# Patient Record
Sex: MALE | Race: OTHER | NOT HISPANIC OR LATINO
[De-identification: names, ages, dates, MRNs, and addresses within clinical notes are randomized per-mention and may not be internally consistent; named-entity substitution may affect disease eponyms.]

---

## 2020-01-27 ENCOUNTER — RECORD ABSTRACTING (OUTPATIENT)
Age: 80
End: 2020-01-27

## 2020-01-27 DIAGNOSIS — Z87.898 PERSONAL HISTORY OF OTHER SPECIFIED CONDITIONS: ICD-10-CM

## 2020-01-27 DIAGNOSIS — Z87.440 PERSONAL HISTORY OF URINARY (TRACT) INFECTIONS: ICD-10-CM

## 2020-01-27 DIAGNOSIS — Z87.438 PERSONAL HISTORY OF OTHER DISEASES OF MALE GENITAL ORGANS: ICD-10-CM

## 2020-01-27 LAB
PSA SERPL-MCNC: 0.2
TESTOST BND SERPL-MCNC: 106.8

## 2020-01-27 RX ORDER — SILDENAFIL CITRATE 100 MG/1
100 TABLET, FILM COATED ORAL
Refills: 0 | Status: ACTIVE | COMMUNITY

## 2020-01-27 RX ORDER — TADALAFIL 20 MG/1
20 TABLET, FILM COATED ORAL
Refills: 0 | Status: ACTIVE | COMMUNITY

## 2020-02-05 RX ORDER — FINASTERIDE 5 MG/1
5 TABLET, FILM COATED ORAL DAILY
Qty: 90 | Refills: 0 | Status: ACTIVE | COMMUNITY
Start: 1900-01-01 | End: 1900-01-01

## 2020-03-10 ENCOUNTER — APPOINTMENT (OUTPATIENT)
Dept: UROLOGY | Facility: CLINIC | Age: 80
End: 2020-03-10
Payer: MEDICARE

## 2020-03-10 VITALS
WEIGHT: 263 LBS | HEIGHT: 70 IN | SYSTOLIC BLOOD PRESSURE: 188 MMHG | BODY MASS INDEX: 37.65 KG/M2 | DIASTOLIC BLOOD PRESSURE: 78 MMHG | TEMPERATURE: 98.3 F

## 2020-03-10 DIAGNOSIS — Z00.00 ENCOUNTER FOR GENERAL ADULT MEDICAL EXAMINATION W/OUT ABNORMAL FINDINGS: ICD-10-CM

## 2020-03-10 LAB
BILIRUB UR QL STRIP: NORMAL
CLARITY UR: CLEAR
COLLECTION METHOD: NORMAL
GLUCOSE UR-MCNC: 1000
HCG UR QL: 0.2 EU/DL
HGB UR QL STRIP.AUTO: NORMAL
KETONES UR-MCNC: NORMAL
LEUKOCYTE ESTERASE UR QL STRIP: NORMAL
NITRITE UR QL STRIP: NORMAL
PH UR STRIP: 5.5
PROT UR STRIP-MCNC: NORMAL
SP GR UR STRIP: 1.01

## 2020-03-10 PROCEDURE — 51741 ELECTRO-UROFLOWMETRY FIRST: CPT

## 2020-03-10 PROCEDURE — 99214 OFFICE O/P EST MOD 30 MIN: CPT | Mod: 25

## 2020-03-10 PROCEDURE — 81003 URINALYSIS AUTO W/O SCOPE: CPT | Mod: QW

## 2020-03-10 PROCEDURE — 76857 US EXAM PELVIC LIMITED: CPT

## 2020-03-10 NOTE — PHYSICAL EXAM
[General Appearance - Well Developed] : well developed [General Appearance - Well Nourished] : well nourished [Normal Appearance] : normal appearance [Well Groomed] : well groomed [General Appearance - In No Acute Distress] : no acute distress [Abdomen Soft] : soft [Abdomen Tenderness] : non-tender [Costovertebral Angle Tenderness] : no ~M costovertebral angle tenderness [Edema] : no peripheral edema [] : no respiratory distress [Respiration, Rhythm And Depth] : normal respiratory rhythm and effort [Exaggerated Use Of Accessory Muscles For Inspiration] : no accessory muscle use [Oriented To Time, Place, And Person] : oriented to person, place, and time [Affect] : the affect was normal [Mood] : the mood was normal [Not Anxious] : not anxious [FreeTextEntry1] : Some memory loss as to medication he is taking [Normal Station and Gait] : the gait and station were normal for the patient's age

## 2020-03-10 NOTE — HISTORY OF PRESENT ILLNESS
[FreeTextEntry1] : 79 year old male last seen by me 8/31/2018 for BPH, incontinence on Finasteride and Alfuzosin. He had a small prostate on cystoscopy and urodynamics testing indicated intrinsic bladder dysfunction. myrbetriq was added to his medicines at that time. Last PSA from 7/24/18 0.2 (on Finasteride).\par \par Patient also has DM, hypothyroidism, elevated cholesterol, angina, HTN, cardiac stents and valve replacement.\par \par Wife tells me that patient has not had the Uroxatral or the finasteride since the beginning of this year. Eng also has not taken the Myrbetriq due to potential cardiac side effects.She also tells me that his memory is failing and is under care for this.\par \par UA today dipstick negative,PVR = 60 cc, prostate - 43 gm, uroflowmetry fair with Peak flow = 8.5 cc/sec, Mean flow = 4 cc/sec, Voided volume = 138 ml.

## 2020-03-10 NOTE — ASSESSMENT
[FreeTextEntry1] : I had an extensive discussion with the patient and his wife about continued use of the alfuzosin and finasteride in view of his stable PVR and prostate size but only fair uroflowmetry. We also discussed the need for routine FU at least yearly. Meds were renewed. Genesis Jiménez MD\par

## 2020-03-10 NOTE — LETTER BODY
[Dear  ___] : Dear  [unfilled], [Courtesy Letter:] : I had the pleasure of seeing your patient, [unfilled], in my office today. [Please see my note below.] : Please see my note below. [Consult Closing:] : Thank you very much for allowing me to participate in the care of this patient.  If you have any questions, please do not hesitate to contact me. [FreeTextEntry3] : Best Regards, \par \par Genesis Jiménez MD\par

## 2020-03-12 LAB — BACTERIA UR CULT: NORMAL

## 2020-08-19 ENCOUNTER — APPOINTMENT (OUTPATIENT)
Dept: UROLOGY | Facility: CLINIC | Age: 80
End: 2020-08-19
Payer: MEDICARE

## 2020-08-19 PROCEDURE — 99442: CPT | Mod: 95

## 2020-08-19 NOTE — LETTER BODY
[Please see my note below.] : Please see my note below. [Dear  ___] : Dear ~ALMA, [Consult Closing:] : Thank you very much for allowing me to participate in the care of this patient.  If you have any questions, please do not hesitate to contact me. [FreeTextEntry1] : I had the pleasure of evaluation of your patient today via a telehealth virtual visit.\par  [FreeTextEntry2] : Seamus Frost MD [FreeTextEntry3] : Best personal regards,\par \par Genesis\par

## 2020-08-19 NOTE — HISTORY OF PRESENT ILLNESS
[Other Location: e.g. Home (Enter Location, City,State)___] : at [unfilled] [Home] : at home, [unfilled] , at the time of the visit. [Verbal consent obtained from patient] : the patient, [unfilled] [Spouse] : spouse [FreeTextEntry1] :  The patient-doctor relationship has been established via telephone communication. The patient's identity has been confirmed. The patient was previously emailed a copy of the telemedicine consent. They have had a chance to review and has now given verbal consent and has requested care to be assessed and treated through telemedicine and understands there maybe limitations in this process and they may need further follow up care in the office and or hospital settings. \par Verbal consent given on 8/19/2020, at 1:10 PM, by Shahram Leon.\par \par This 79 year old male was last seen on 3/10 59917 for BPH and incontinence which is being treated with finasteride and alfuzosin. This visit today is for pain in the right groin, hip and scrotum which the patient states has bee present for the last 4 days or so. His wife tells me that it has been present for longer, and the patient admits that he is having some issues with memory. He has not had any fever, and denies any N/V, constipation or diarrhea. He continue to urinate well on the above mediations. He has not tried any medication for the pain, other than Tylenol, and he denies any lumps or swelling.\par \par  The patient denies fevers, chills, nausea and or vomiting and no unexplained weight loss. \par All pertinent parts of the patient PFSH (past medical, family and social histories), laboratory, radiological studies and physician notes were reviewed prior to starting the face to face portion of the telemedicine visit. Questionnaire results were discussed with patient.\par

## 2020-08-19 NOTE — ASSESSMENT
[FreeTextEntry1] : We discussed possible diagnoses and I recommended that he have a scrotal US and urine C+S now. I also recommended that he take Advil for the pain while we wait for the results of these tests. I also asked him to call the office or proceed to the ER if his pain became significantly worse, he developed fever, he developed N/V. We ended the audio portion of the visit at 1:21 PM. Genesis Jiménez MD\par

## 2021-05-20 ENCOUNTER — APPOINTMENT (OUTPATIENT)
Dept: UROLOGY | Facility: CLINIC | Age: 81
End: 2021-05-20
Payer: MEDICARE

## 2021-05-20 ENCOUNTER — NON-APPOINTMENT (OUTPATIENT)
Age: 81
End: 2021-05-20

## 2021-05-20 VITALS — HEART RATE: 84 BPM | SYSTOLIC BLOOD PRESSURE: 156 MMHG | DIASTOLIC BLOOD PRESSURE: 89 MMHG | TEMPERATURE: 97.2 F

## 2021-05-20 LAB
BILIRUB UR QL STRIP: NORMAL
CLARITY UR: CLEAR
COLLECTION METHOD: NORMAL
GLUCOSE UR-MCNC: 1000
HCG UR QL: 1 EU/DL
HGB UR QL STRIP.AUTO: NORMAL
KETONES UR-MCNC: NORMAL
LEUKOCYTE ESTERASE UR QL STRIP: NORMAL
NITRITE UR QL STRIP: NORMAL
PH UR STRIP: 5
PROT UR STRIP-MCNC: NORMAL
SP GR UR STRIP: 1.01

## 2021-05-20 PROCEDURE — 51798 US URINE CAPACITY MEASURE: CPT

## 2021-05-20 PROCEDURE — 99214 OFFICE O/P EST MOD 30 MIN: CPT

## 2021-05-20 NOTE — HISTORY OF PRESENT ILLNESS
[FreeTextEntry1] : 79 yo male returns for follow up.  He has been seen previously for a hx of BPH.  He has been on finasteride and alfuzosin for many years.  His urinary symptoms have generally been stable over time, but he does note some increase in urinary urgency with occasional urge incontinence.  This happens less than once a month.  \par \par PVR = 49 cc\par Prostate volume = 81 cc

## 2021-05-20 NOTE — PHYSICAL EXAM
[General Appearance - Well Developed] : well developed [Normal Appearance] : normal appearance [General Appearance - In No Acute Distress] : no acute distress [FreeTextEntry1] : Obese [Abdomen Soft] : soft [Abdomen Tenderness] : non-tender [Abdomen Hernia] : no hernia was discovered [Urethral Meatus] : meatus normal [Epididymis] : the epididymides were normal [Penis Abnormality] : normal circumcised penis [Testes Tenderness] : no tenderness of the testes [Testes Mass (___cm)] : there were no testicular masses [Prostate Tenderness] : the prostate was not tender [No Prostate Nodules] : no prostate nodules [Prostate Size ___ (0-4)] : prostate size [unfilled] (scale: 0-4) [Skin Color & Pigmentation] : normal skin color and pigmentation [Heart Rate And Rhythm] : Heart rate and rhythm were normal [] : no respiratory distress [Oriented To Time, Place, And Person] : oriented to person, place, and time [Normal Station and Gait] : the gait and station were normal for the patient's age [No Focal Deficits] : no focal deficits

## 2021-05-20 NOTE — ASSESSMENT
[FreeTextEntry1] : 79 yo male with hx of BPH on alpha blocker and 5ARI therapy now with occasional urinary urgency and urge incontinence.  We discussed treatment options including adding a beta-3 agonist to his medication regimen.  He does not have urgency often enough for him to want to start taking another medication.  We also discussed surgical options to treat BPH.  I have given them patient education information that reviews more invasive and less invasive options.  He will continue with his current treatment regimen and he will return if he would like to proceed with other treatment options.

## 2021-05-21 LAB
APPEARANCE: CLEAR
BACTERIA: NEGATIVE
BILIRUBIN URINE: NEGATIVE
BLOOD URINE: NEGATIVE
COLOR: YELLOW
GLUCOSE QUALITATIVE U: ABNORMAL
HYALINE CASTS: 0 /LPF
KETONES URINE: NEGATIVE
LEUKOCYTE ESTERASE URINE: NEGATIVE
MICROSCOPIC-UA: NORMAL
NITRITE URINE: NEGATIVE
PH URINE: 6
PROTEIN URINE: NORMAL
RED BLOOD CELLS URINE: 1 /HPF
SPECIFIC GRAVITY URINE: 1.04
SQUAMOUS EPITHELIAL CELLS: 0 /HPF
UROBILINOGEN URINE: NORMAL
WHITE BLOOD CELLS URINE: 0 /HPF

## 2021-05-24 LAB — BACTERIA UR CULT: NORMAL

## 2021-12-23 RX ORDER — ALFUZOSIN HYDROCHLORIDE 10 MG/1
10 TABLET, EXTENDED RELEASE ORAL DAILY
Qty: 90 | Refills: 1 | Status: ACTIVE | COMMUNITY
Start: 2020-03-10 | End: 1900-01-01

## 2022-07-28 ENCOUNTER — APPOINTMENT (OUTPATIENT)
Dept: UROLOGY | Facility: CLINIC | Age: 82
End: 2022-07-28

## 2022-07-28 LAB
BILIRUB UR QL STRIP: NORMAL
CLARITY UR: CLEAR
COLLECTION METHOD: NORMAL
GLUCOSE UR-MCNC: NORMAL
HCG UR QL: 1 EU/DL
HGB UR QL STRIP.AUTO: NORMAL
KETONES UR-MCNC: NORMAL
LEUKOCYTE ESTERASE UR QL STRIP: NORMAL
NITRITE UR QL STRIP: NORMAL
PH UR STRIP: 5.5
PROT UR STRIP-MCNC: NORMAL
SP GR UR STRIP: 1.01

## 2022-07-28 PROCEDURE — 51798 US URINE CAPACITY MEASURE: CPT

## 2022-07-28 PROCEDURE — 99214 OFFICE O/P EST MOD 30 MIN: CPT

## 2022-07-28 PROCEDURE — 81003 URINALYSIS AUTO W/O SCOPE: CPT | Mod: QW

## 2022-07-28 NOTE — ASSESSMENT
[FreeTextEntry1] : Patient appears to have had a good response to the alfuzosin and finasteride with good bladder emptying and minimal nocturia.  He requested and I agree with renewals of these medications.  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  I reviewed with the patient and his wife the indications risks benefits and chances for success with this medication and the need to be on the medicine for 2 to 3 weeks before any improvement might be seen.  With their agreement to this plan we ordered Myrbetriq 25 mg daily for the patient.  If all goes well we will follow-up in 6 months otherwise they should call sooner for another appointment. Urine sent for C+S and cytology.  Genesis Jiménez MD\par

## 2022-07-28 NOTE — HISTORY OF PRESENT ILLNESS
[FreeTextEntry1] : 82 YO M seen on 8/19/2020 via [facility] for BPH and incontinence which is being treated with finasteride and alfuzosin. This visit today is for pain in the right groin, hip and scrotum which the patient states has bee present for the last 4 days or so. His wife tells me that it has been present for longer, and the patient admits that he is having some issues with memory. He has not had any fever, and denies any N/V, constipation or diarrhea. He continue to urinate well on the above mediations. He has not tried any medication for the pain, other than Tylenol, and he denies any lumps or swelling.\par We discussed possible diagnoses and I recommended that he have a scrotal US and urine C+S now. I also recommended that he take Advil for the pain while we wait for the results of these tests. I also asked him to call the office or proceed to the ER if his pain became significantly worse, he developed fever, he developed N/V.\par US 8/24/2020 No testicular masses.\par \par Patient saw [facility] 5/20/2021. They discussed surgical options for BPH v. addition of Beta 3 agonist. Patient to consider.\par \par Patient seen TODAY 7/28/2022 to reassess. He continues to have urgency and urge incontinence. He decided against the B 3 agonist last visit since he was on too many pills. He also reports nocturia x 0 - 2, no hematuria or dysuria.\par UA 1000 GLU (on Jardiance)\par PVR 30 cc\par \par Patient on multiple cardiac and DM medication.\par  The patient denies fevers, chills, nausea and or vomiting and no unexplained weight loss. \par All pertinent parts of the patient PFSH (past medical, family and social histories), laboratory, radiological studies and physician notes were reviewed prior to starting the face to face portion of the telemedicine visit. Questionnaire results were discussed with patient.\par

## 2022-07-28 NOTE — PHYSICAL EXAM
[Normal Appearance] : normal appearance [General Appearance - In No Acute Distress] : no acute distress [Abdomen Soft] : soft [Abdomen Tenderness] : non-tender [Costovertebral Angle Tenderness] : no ~M costovertebral angle tenderness [Urethral Meatus] : meatus normal [Penis Abnormality] : normal circumcised penis [Urinary Bladder Findings] : the bladder was normal on palpation [Scrotum] : the scrotum was normal [Epididymis] : the epididymides were normal [Testes Tenderness] : no tenderness of the testes [Testes Mass (___cm)] : there were no testicular masses [Prostate Tenderness] : the prostate was not tender [No Prostate Nodules] : no prostate nodules [Prostate Size ___ (0-4)] : prostate size [unfilled] (scale: 0-4) [Edema] : no peripheral edema [] : no respiratory distress [Respiration, Rhythm And Depth] : normal respiratory rhythm and effort [Exaggerated Use Of Accessory Muscles For Inspiration] : no accessory muscle use [Oriented To Time, Place, And Person] : oriented to person, place, and time [Affect] : the affect was normal [Mood] : the mood was normal [Not Anxious] : not anxious [FreeTextEntry1] : Ambulates with a cane, slowly [No Focal Deficits] : no focal deficits

## 2022-08-03 LAB
BACTERIA UR CULT: NORMAL
URINE CYTOLOGY: NORMAL

## 2023-01-26 ENCOUNTER — APPOINTMENT (OUTPATIENT)
Dept: UROLOGY | Facility: CLINIC | Age: 83
End: 2023-01-26
Payer: MEDICARE

## 2023-01-30 ENCOUNTER — RX RENEWAL (OUTPATIENT)
Age: 83
End: 2023-01-30

## 2023-02-01 NOTE — PHYSICAL EXAM
[Normal Appearance] : normal appearance [General Appearance - In No Acute Distress] : no acute distress [Oriented To Time, Place, And Person] : oriented to person, place, and time [Affect] : the affect was normal [Mood] : the mood was normal [Not Anxious] : not anxious [FreeTextEntry1] : Obese

## 2023-02-01 NOTE — HISTORY OF PRESENT ILLNESS
[FreeTextEntry1] : 81 YO M seen on 8/19/2020 via [facility] for BPH and incontinence which is being treated with finasteride and alfuzosin. This visit today is for pain in the right groin, hip and scrotum which the patient states has bee present for the last 4 days or so. His wife tells me that it has been present for longer, and the patient admits that he is having some issues with memory. He has not had any fever, and denies any N/V, constipation or diarrhea. He continue to urinate well on the above mediations. He has not tried any medication for the pain, other than Tylenol, and he denies any lumps or swelling.\par We discussed possible diagnoses and I recommended that he have a scrotal US and urine C+S now. I also recommended that he take Advil for the pain while we wait for the results of these tests. I also asked him to call the office or proceed to the ER if his pain became significantly worse, he developed fever, he developed N/V.\par US 8/24/2020 No testicular masses.\par \par Patient saw Ema 5/20/2021. They discussed surgical options for BPH v. addition of Beta 3 agonist. Patient to consider.\par \par Patient seen 7/28/2022 to reassess. He continues to have urgency and urge incontinence. He decided against the B 3 agonist last visit since he was on too many pills. He also reports nocturia x 0 - 2, no hematuria or dysuria.\par UA 1000 GLU (on Jardiance)\par PVR 30 cc\par \par Patient appears to have had a good response to the alfuzosin and finasteride with good bladder emptying and minimal nocturia.  He requested and I agree with renewals of these medications.  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  I reviewed with the patient and his wife the indications risks benefits and chances for success with this medication and the need to be on the medicine for 2 to 3 weeks before any improvement might be seen.  With their agreement to this plan we ordered Myrbetriq 25 mg daily for the patient.  If all goes well we will follow-up in 6 months otherwise they should call sooner for another appointment. Urine sent for C+S and cytology.  \par \par urine C+S and cytology negative\par \par Patient cancelled 1/26/2023 to reassess LUTS. \par \par \par Patient on multiple cardiac and DM medication.\par  The patient denies fevers, chills, nausea and or vomiting and no unexplained weight loss. \par All pertinent parts of the patient PFSH (past medical, family and social histories), laboratory, radiological studies and physician notes were reviewed prior to starting the face to face portion of the telemedicine visit. Questionnaire results were discussed with patient.\par

## 2023-02-10 ENCOUNTER — APPOINTMENT (OUTPATIENT)
Dept: UROLOGY | Facility: CLINIC | Age: 83
End: 2023-02-10
Payer: MEDICARE

## 2023-02-10 VITALS
TEMPERATURE: 97.8 F | HEART RATE: 85 BPM | WEIGHT: 263 LBS | HEIGHT: 70 IN | SYSTOLIC BLOOD PRESSURE: 151 MMHG | BODY MASS INDEX: 37.65 KG/M2 | DIASTOLIC BLOOD PRESSURE: 87 MMHG

## 2023-02-10 PROCEDURE — 51798 US URINE CAPACITY MEASURE: CPT

## 2023-02-10 PROCEDURE — 99213 OFFICE O/P EST LOW 20 MIN: CPT

## 2023-02-10 PROCEDURE — 51741 ELECTRO-UROFLOWMETRY FIRST: CPT

## 2023-02-10 NOTE — ASSESSMENT
[FreeTextEntry1] : He is doing well urologically and his lower urinary tract symptoms remain stable. He will continue on the alfuzosin, finasteride, and Myrbetriq, which were renewed today. If there are no new problems, he will follow up in 6 months.  Genesis Jiménez MD\par

## 2023-02-10 NOTE — HISTORY OF PRESENT ILLNESS
[FreeTextEntry1] : 83 YO M seen on 8/19/2020 via [facility] for BPH and incontinence which is being treated with finasteride and alfuzosin. This visit today is for pain in the right groin, hip and scrotum which the patient states has bee present for the last 4 days or so. His wife tells me that it has been present for longer, and the patient admits that he is having some issues with memory. He has not had any fever, and denies any N/V, constipation or diarrhea. He continue to urinate well on the above mediations. He has not tried any medication for the pain, other than Tylenol, and he denies any lumps or swelling.\par We discussed possible diagnoses and I recommended that he have a scrotal US and urine C+S now. I also recommended that he take Advil for the pain while we wait for the results of these tests. I also asked him to call the office or proceed to the ER if his pain became significantly worse, he developed fever, he developed N/V.\par US 8/24/2020 No testicular masses.\par \par Patient saw Ema 5/20/2021. They discussed surgical options for BPH v. addition of Beta 3 agonist. Patient to consider.\par \par Patient seen 7/28/2022 to reassess. He continues to have urgency and urge incontinence. He decided against the B 3 agonist last visit since he was on too many pills. He also reports nocturia x 0 - 2, no hematuria or dysuria.\par UA 1000 GLU (on Jardiance)\par PVR 30 cc\par \par Patient appears to have had a good response to the alfuzosin and finasteride with good bladder emptying and minimal nocturia.  He requested and I agree with renewals of these medications.  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  I reviewed with the patient and his wife the indications risks benefits and chances for success with this medication and the need to be on the medicine for 2 to 3 weeks before any improvement might be seen.  With their agreement to this plan we ordered Myrbetriq 25 mg daily for the patient.  If all goes well we will follow-up in 6 months otherwise they should call sooner for another appointment. Urine sent for C+S and cytology.  \par \par urine C+S and cytology negative\par \par Patient cancelled 1/26/2023 to reassess LUTS. \par \par Patient seen TODAY 2/10/2023 to reassess LUTS. His lower urinary tract symptoms remain stable on the alfuzosin, finasteride, and myrbetriq. He denies gross hematuria and nocturia.\par \par UA 1000 glucose (on Januvia)\par uroflow: avg rate 4 ml/s, max rate 11 ml/s (voided 50 cc)\par PVR 94 cc\par \par Patient on multiple cardiac and DM medication.\par  The patient denies fevers, chills, nausea and or vomiting and no unexplained weight loss. \par All pertinent parts of the patient PFSH (past medical, family and social histories), laboratory, radiological studies and physician notes were reviewed prior to starting the face to face portion of the telemedicine visit. Questionnaire results were discussed with patient.\par

## 2023-11-17 ENCOUNTER — APPOINTMENT (OUTPATIENT)
Dept: UROLOGY | Facility: CLINIC | Age: 83
End: 2023-11-17
Payer: MEDICARE

## 2023-11-17 DIAGNOSIS — R39.15 URGENCY OF URINATION: ICD-10-CM

## 2023-11-17 DIAGNOSIS — N50.811 RIGHT TESTICULAR PAIN: ICD-10-CM

## 2023-11-17 PROCEDURE — 51798 US URINE CAPACITY MEASURE: CPT

## 2023-11-17 PROCEDURE — 99214 OFFICE O/P EST MOD 30 MIN: CPT

## 2023-11-17 PROCEDURE — 51741 ELECTRO-UROFLOWMETRY FIRST: CPT

## 2023-11-17 RX ORDER — MIRABEGRON 25 MG/1
25 TABLET, FILM COATED, EXTENDED RELEASE ORAL
Qty: 90 | Refills: 3 | Status: ACTIVE | COMMUNITY
Start: 2022-07-28 | End: 1900-01-01

## 2023-11-17 RX ORDER — FINASTERIDE 5 MG/1
5 TABLET, FILM COATED ORAL DAILY
Qty: 90 | Refills: 3 | Status: ACTIVE | COMMUNITY
Start: 2020-03-10 | End: 1900-01-01

## 2023-11-17 RX ORDER — ALFUZOSIN HYDROCHLORIDE 10 MG/1
10 TABLET, EXTENDED RELEASE ORAL
Qty: 90 | Refills: 3 | Status: ACTIVE | COMMUNITY
Start: 1900-01-01 | End: 1900-01-01

## 2024-05-17 ENCOUNTER — APPOINTMENT (OUTPATIENT)
Dept: UROLOGY | Facility: CLINIC | Age: 84
End: 2024-05-17
Payer: MEDICARE

## 2024-05-17 PROCEDURE — 81003 URINALYSIS AUTO W/O SCOPE: CPT | Mod: QW

## 2024-05-17 NOTE — HISTORY OF PRESENT ILLNESS
[FreeTextEntry1] : 82 YO M seen on 8/19/2020 via [facility] for BPH and incontinence which is being treated with finasteride and alfuzosin. This visit today is for pain in the right groin, hip and scrotum which the patient states has bee present for the last 4 days or so. His wife tells me that it has been present for longer, and the patient admits that he is having some issues with memory. He has not had any fever, and denies any N/V, constipation or diarrhea. He continue to urinate well on the above mediations. He has not tried any medication for the pain, other than Tylenol, and he denies any lumps or swelling. We discussed possible diagnoses and I recommended that he have a scrotal US and urine C+S now. I also recommended that he take Advil for the pain while we wait for the results of these tests. I also asked him to call the office or proceed to the ER if his pain became significantly worse, he developed fever, he developed N/V. US 8/24/2020 No testicular masses.  Patient saw Ema 5/20/2021. They discussed surgical options for BPH v. addition of Beta 3 agonist. Patient to consider.  Patient seen 7/28/2022 to reassess. He continues to have urgency and urge incontinence. He decided against the B 3 agonist last visit since he was on too many pills. He also reports nocturia x 0 - 2, no hematuria or dysuria. UA 1000 GLU (on Jardiance) PVR 30 cc Patient appears to have had a good response to the alfuzosin and finasteride with good bladder emptying and minimal nocturia.  He requested and I agree with renewals of these medications.  We will reassess this issue in 1 year. 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  I reviewed with the patient and his wife the indications risks benefits and chances for success with this medication and the need to be on the medicine for 2 to 3 weeks before any improvement might be seen.  With their agreement to this plan we ordered Myrbetriq 25 mg daily for the patient.  If all goes well we will follow-up in 6 months otherwise they should call sooner for another appointment. Urine sent for C+S and cytology.   urine C+S and cytology negative  Patient cancelled 1/26/2023 to reassess LUTS.   Patient seen 2/10/2023 to reassess LUTS. His lower urinary tract symptoms remain stable on the alfuzosin, finasteride, and myrbetriq. He denies gross hematuria and nocturia. UA 1000 glucose (on Januvia) uroflow: avg rate 4 ml/s, max rate 11 ml/s (voided 50 cc) PVR 94 cc Patient on multiple cardiac and DM medication.  The patient denies fevers, chills, nausea and or vomiting and no unexplained weight loss.  All pertinent parts of the patient PFSH (past medical, family and social histories), laboratory, radiological studies and physician notes were reviewed prior to starting the face to face portion of the telemedicine visit. Questionnaire results were discussed with patient. He is doing well urologically and his lower urinary tract symptoms remain stable. He will continue on the alfuzosin, finasteride, and Myrbetriq, which were renewed today. If there are no new problems, he will follow up in 6 months.   [facility] 9/27/2023 Cancelled 11/8/2023  Patient seen 11/17/2023 to reassess. His wife told me that because of some cognitive decline, he has been very sedentary since last visit. This has caused him to gain weight and his abdomen has enlarged. He had a recent abdominal scan, NA today, they will fax a copy of the result to our office to review. 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  His LUTS have remained reasonably stable on the alfuzosin, finasteride and Myrbetriq. He has been having trouble controlling his DM and his A1C levels have been 9.9-10. He is presently on Jardiance, Januvia, Metformin, Glimepiride His right testicular pain has resolved. UA 1000Glucose, trace ketone IPSS 11 ANTHONY 6 MATY 19 Uroflow fair: Vol 106 ml, V Max 11 ml/s, V Ave 5 ml/s PVR 10 ml I reviewed with the patient and his wife at their request the MRI/MRI studies of the neck head and orbits but referred them to the neurologist for further assessment and next steps. As for his increasing abdominal girth, this does appear to be due to dietary habits and significant decrease in his exercise and I urged him to increase his daily exercise while paying greater attention to his diet and his diabetes. I urged him to return to his primary doc for continued treatment for the diabetes in view of the A1c value that he shared with me. As for his urinary issues, the medications that we have him on appear to be allowing good emptying of the bladder reasonably good storage and a reasonably good urine flow for his age. All 3 medications were renewed and we made plans to follow-up in 6 months with another uroflowmetry and PVR.   Patient seen TODAY 5/17/2024 to reassess.

## 2024-06-26 ENCOUNTER — APPOINTMENT (OUTPATIENT)
Dept: UROLOGY | Facility: CLINIC | Age: 84
End: 2024-06-26
Payer: MEDICARE

## 2024-06-26 DIAGNOSIS — N32.89 OTHER SPECIFIED DISORDERS OF BLADDER: ICD-10-CM

## 2024-06-26 DIAGNOSIS — N13.8 BENIGN PROSTATIC HYPERPLASIA WITH LOWER URINARY TRACT SYMPMS: ICD-10-CM

## 2024-06-26 DIAGNOSIS — N40.1 BENIGN PROSTATIC HYPERPLASIA WITH LOWER URINARY TRACT SYMPMS: ICD-10-CM

## 2024-09-09 ENCOUNTER — NON-APPOINTMENT (OUTPATIENT)
Age: 84
End: 2024-09-09

## 2024-11-01 ENCOUNTER — APPOINTMENT (OUTPATIENT)
Dept: UROLOGY | Facility: CLINIC | Age: 84
End: 2024-11-01

## 2024-11-01 DIAGNOSIS — N13.8 BENIGN PROSTATIC HYPERPLASIA WITH LOWER URINARY TRACT SYMPMS: ICD-10-CM

## 2024-11-01 DIAGNOSIS — N32.89 OTHER SPECIFIED DISORDERS OF BLADDER: ICD-10-CM

## 2024-11-01 DIAGNOSIS — R39.15 URGENCY OF URINATION: ICD-10-CM

## 2024-11-01 DIAGNOSIS — N40.1 BENIGN PROSTATIC HYPERPLASIA WITH LOWER URINARY TRACT SYMPMS: ICD-10-CM

## 2024-11-26 ENCOUNTER — RX RENEWAL (OUTPATIENT)
Age: 84
End: 2024-11-26

## 2025-01-17 ENCOUNTER — APPOINTMENT (OUTPATIENT)
Dept: UROLOGY | Facility: CLINIC | Age: 85
End: 2025-01-17

## 2025-01-17 DIAGNOSIS — N50.811 RIGHT TESTICULAR PAIN: ICD-10-CM

## 2025-01-17 DIAGNOSIS — N32.89 OTHER SPECIFIED DISORDERS OF BLADDER: ICD-10-CM

## 2025-01-17 DIAGNOSIS — N13.8 BENIGN PROSTATIC HYPERPLASIA WITH LOWER URINARY TRACT SYMPMS: ICD-10-CM

## 2025-01-17 DIAGNOSIS — N40.1 BENIGN PROSTATIC HYPERPLASIA WITH LOWER URINARY TRACT SYMPMS: ICD-10-CM

## 2025-02-27 ENCOUNTER — APPOINTMENT (OUTPATIENT)
Dept: UROLOGY | Facility: CLINIC | Age: 85
End: 2025-02-27

## 2025-02-27 DIAGNOSIS — N40.1 BENIGN PROSTATIC HYPERPLASIA WITH LOWER URINARY TRACT SYMPMS: ICD-10-CM

## 2025-02-27 DIAGNOSIS — N13.8 BENIGN PROSTATIC HYPERPLASIA WITH LOWER URINARY TRACT SYMPMS: ICD-10-CM

## 2025-03-12 ENCOUNTER — APPOINTMENT (OUTPATIENT)
Dept: UROLOGY | Facility: CLINIC | Age: 85
End: 2025-03-12

## 2025-03-12 DIAGNOSIS — R39.15 URGENCY OF URINATION: ICD-10-CM

## 2025-03-12 DIAGNOSIS — N32.89 OTHER SPECIFIED DISORDERS OF BLADDER: ICD-10-CM

## 2025-03-12 DIAGNOSIS — N13.8 BENIGN PROSTATIC HYPERPLASIA WITH LOWER URINARY TRACT SYMPMS: ICD-10-CM

## 2025-03-12 DIAGNOSIS — N40.1 BENIGN PROSTATIC HYPERPLASIA WITH LOWER URINARY TRACT SYMPMS: ICD-10-CM

## 2025-05-15 ENCOUNTER — APPOINTMENT (OUTPATIENT)
Dept: UROLOGY | Facility: CLINIC | Age: 85
End: 2025-05-15

## 2025-05-15 DIAGNOSIS — N40.1 BENIGN PROSTATIC HYPERPLASIA WITH LOWER URINARY TRACT SYMPMS: ICD-10-CM

## 2025-05-15 DIAGNOSIS — N13.8 BENIGN PROSTATIC HYPERPLASIA WITH LOWER URINARY TRACT SYMPMS: ICD-10-CM

## 2025-05-16 ENCOUNTER — APPOINTMENT (OUTPATIENT)
Dept: UROLOGY | Facility: CLINIC | Age: 85
End: 2025-05-16
Payer: MEDICARE

## 2025-05-16 ENCOUNTER — RX RENEWAL (OUTPATIENT)
Age: 85
End: 2025-05-16

## 2025-05-16 PROCEDURE — 99213 OFFICE O/P EST LOW 20 MIN: CPT

## 2025-05-22 ENCOUNTER — APPOINTMENT (OUTPATIENT)
Dept: GERIATRICS | Facility: CLINIC | Age: 85
End: 2025-05-22

## 2025-05-22 DIAGNOSIS — Z87.898 PERSONAL HISTORY OF OTHER SPECIFIED CONDITIONS: ICD-10-CM

## 2025-05-22 DIAGNOSIS — N32.89 OTHER SPECIFIED DISORDERS OF BLADDER: ICD-10-CM

## 2025-05-29 ENCOUNTER — APPOINTMENT (OUTPATIENT)
Dept: GERIATRICS | Facility: CLINIC | Age: 85
End: 2025-05-29

## 2025-05-29 DIAGNOSIS — Z87.891 PERSONAL HISTORY OF NICOTINE DEPENDENCE: ICD-10-CM

## 2025-05-29 DIAGNOSIS — Z86.79 PERSONAL HISTORY OF OTHER DISEASES OF THE CIRCULATORY SYSTEM: ICD-10-CM

## 2025-05-29 PROBLEM — E03.9 HYPOTHYROIDISM, ADULT: Status: ACTIVE | Noted: 2025-05-29

## 2025-05-29 PROBLEM — Z95.2 S/P TAVR (TRANSCATHETER AORTIC VALVE REPLACEMENT): Status: ACTIVE | Noted: 2025-05-29

## 2025-05-29 PROBLEM — G30.1 MODERATE LATE ONSET ALZHEIMER'S DEMENTIA WITH OTHER BEHAVIORAL DISTURBANCE: Status: ACTIVE | Noted: 2025-05-29

## 2025-05-29 PROBLEM — I25.10 CORONARY ARTERY DISEASE INVOLVING NATIVE CORONARY ARTERY OF NATIVE HEART WITHOUT ANGINA PECTORIS: Status: ACTIVE | Noted: 2025-05-29

## 2025-05-29 PROBLEM — E11.59 TYPE 2 DIABETES MELLITUS WITH OTHER CIRCULATORY COMPLICATION, WITHOUT LONG-TERM CURRENT USE OF INSULIN: Status: ACTIVE | Noted: 2025-05-29

## 2025-05-29 PROBLEM — F32.4 MAJOR DEPRESSIVE DISORDER IN PARTIAL REMISSION, UNSPECIFIED WHETHER RECURRENT: Status: ACTIVE | Noted: 2025-05-29

## 2025-06-03 ENCOUNTER — APPOINTMENT (OUTPATIENT)
Dept: GERIATRICS | Facility: CLINIC | Age: 85
End: 2025-06-03
Payer: MEDICARE

## 2025-06-03 VITALS
OXYGEN SATURATION: 96 % | TEMPERATURE: 98.1 F | WEIGHT: 216.5 LBS | HEIGHT: 71.5 IN | HEART RATE: 118 BPM | SYSTOLIC BLOOD PRESSURE: 131 MMHG | BODY MASS INDEX: 29.65 KG/M2 | DIASTOLIC BLOOD PRESSURE: 77 MMHG

## 2025-06-03 DIAGNOSIS — N50.811 RIGHT TESTICULAR PAIN: ICD-10-CM

## 2025-06-03 DIAGNOSIS — F02.B18 ALZHEIMER'S DISEASE WITH LATE ONSET: ICD-10-CM

## 2025-06-03 DIAGNOSIS — F32.4 MAJOR DEPRESSIVE DISORDER, SINGLE EPISODE, IN PARTIAL REMISSION: ICD-10-CM

## 2025-06-03 DIAGNOSIS — I25.10 ATHEROSCLEROTIC HEART DISEASE OF NATIVE CORONARY ARTERY W/OUT ANGINA PECTORIS: ICD-10-CM

## 2025-06-03 DIAGNOSIS — I10 ESSENTIAL (PRIMARY) HYPERTENSION: ICD-10-CM

## 2025-06-03 DIAGNOSIS — I50.30 UNSPECIFIED DIASTOLIC (CONGESTIVE) HEART FAILURE: ICD-10-CM

## 2025-06-03 DIAGNOSIS — E11.59 TYPE 2 DIABETES MELLITUS WITH OTHER CIRCULATORY COMPLICATIONS: ICD-10-CM

## 2025-06-03 DIAGNOSIS — N13.8 BENIGN PROSTATIC HYPERPLASIA WITH LOWER URINARY TRACT SYMPMS: ICD-10-CM

## 2025-06-03 DIAGNOSIS — N40.1 BENIGN PROSTATIC HYPERPLASIA WITH LOWER URINARY TRACT SYMPMS: ICD-10-CM

## 2025-06-03 DIAGNOSIS — G30.1 ALZHEIMER'S DISEASE WITH LATE ONSET: ICD-10-CM

## 2025-06-03 DIAGNOSIS — E03.9 HYPOTHYROIDISM, UNSPECIFIED: ICD-10-CM

## 2025-06-03 DIAGNOSIS — Z95.2 PRESENCE OF PROSTHETIC HEART VALVE: ICD-10-CM

## 2025-06-03 PROCEDURE — 99205 OFFICE O/P NEW HI 60 MIN: CPT

## 2025-06-04 LAB
BASOPHILS # BLD AUTO: 0.04 K/UL
BASOPHILS NFR BLD AUTO: 0.5 %
EOSINOPHIL # BLD AUTO: 0.06 K/UL
EOSINOPHIL NFR BLD AUTO: 0.8 %
HCT VFR BLD CALC: 42.9 %
HGB BLD-MCNC: 13.4 G/DL
IMM GRANULOCYTES NFR BLD AUTO: 0.3 %
LYMPHOCYTES # BLD AUTO: 2.6 K/UL
LYMPHOCYTES NFR BLD AUTO: 33.2 %
MAN DIFF?: NORMAL
MCHC RBC-ENTMCNC: 27.8 PG
MCHC RBC-ENTMCNC: 31.2 G/DL
MCV RBC AUTO: 89 FL
MONOCYTES # BLD AUTO: 0.62 K/UL
MONOCYTES NFR BLD AUTO: 7.9 %
NEUTROPHILS # BLD AUTO: 4.48 K/UL
NEUTROPHILS NFR BLD AUTO: 57.3 %
PLATELET # BLD AUTO: 190 K/UL
RBC # BLD: 4.82 M/UL
RBC # FLD: 16.4 %
WBC # FLD AUTO: 7.82 K/UL

## 2025-06-05 PROBLEM — I50.30 (HFPEF) HEART FAILURE WITH PRESERVED EJECTION FRACTION: Status: ACTIVE | Noted: 2025-06-05

## 2025-06-05 RX ORDER — CLOPIDOGREL 75 MG/1
75 TABLET, FILM COATED ORAL
Refills: 0 | Status: DISCONTINUED | COMMUNITY
End: 2025-06-05

## 2025-06-05 RX ORDER — MEMANTINE HYDROCHLORIDE 14 MG/1
14 CAPSULE, EXTENDED RELEASE ORAL
Refills: 0 | Status: ACTIVE | COMMUNITY

## 2025-06-05 RX ORDER — CLOPIDOGREL BISULFATE 75 MG/1
75 TABLET, FILM COATED ORAL
Refills: 0 | Status: ACTIVE | COMMUNITY

## 2025-06-05 RX ORDER — ATENOLOL 25 MG/1
25 TABLET ORAL TWICE DAILY
Refills: 0 | Status: ACTIVE | COMMUNITY

## 2025-06-05 RX ORDER — LOSARTAN POTASSIUM 50 MG/1
50 TABLET, FILM COATED ORAL DAILY
Refills: 0 | Status: ACTIVE | COMMUNITY

## 2025-06-05 RX ORDER — ALPHA LIPOIC ACID 150 MG
CAPSULE ORAL
Refills: 0 | Status: ACTIVE | COMMUNITY

## 2025-06-05 RX ORDER — LEVOTHYROXINE SODIUM 137 UG/1
137 CAPSULE ORAL
Refills: 0 | Status: ACTIVE | COMMUNITY

## 2025-06-05 RX ORDER — FUROSEMIDE 40 MG/1
40 TABLET ORAL DAILY
Refills: 0 | Status: ACTIVE | COMMUNITY
Start: 2025-06-05

## 2025-06-05 RX ORDER — DONEPEZIL HYDROCHLORIDE 23 MG/1
23 TABLET, FILM COATED ORAL
Refills: 0 | Status: ACTIVE | COMMUNITY

## 2025-06-05 RX ORDER — RANOLAZINE 500 MG/1
500 TABLET, FILM COATED, EXTENDED RELEASE ORAL
Refills: 0 | Status: ACTIVE | COMMUNITY

## 2025-06-05 RX ORDER — ROSUVASTATIN CALCIUM 20 MG/1
20 TABLET, FILM COATED ORAL
Refills: 0 | Status: ACTIVE | COMMUNITY

## 2025-06-05 RX ORDER — METFORMIN HYDROCHLORIDE 1000 MG/1
1000 TABLET, COATED ORAL
Refills: 0 | Status: ACTIVE | COMMUNITY

## 2025-06-05 RX ORDER — EMPAGLIFLOZIN 10 MG/1
10 TABLET, FILM COATED ORAL
Refills: 0 | Status: ACTIVE | COMMUNITY

## 2025-06-05 RX ORDER — GLIMEPIRIDE 4 MG/1
4 TABLET ORAL
Refills: 0 | Status: ACTIVE | COMMUNITY

## 2025-06-09 ENCOUNTER — NON-APPOINTMENT (OUTPATIENT)
Age: 85
End: 2025-06-09

## 2025-06-19 ENCOUNTER — TRANSCRIPTION ENCOUNTER (OUTPATIENT)
Age: 85
End: 2025-06-19

## 2025-07-22 ENCOUNTER — APPOINTMENT (OUTPATIENT)
Dept: GERIATRICS | Facility: CLINIC | Age: 85
End: 2025-07-22

## 2025-08-12 ENCOUNTER — APPOINTMENT (OUTPATIENT)
Dept: GERIATRICS | Facility: CLINIC | Age: 85
End: 2025-08-12

## 2025-08-26 ENCOUNTER — APPOINTMENT (OUTPATIENT)
Dept: GERIATRICS | Facility: CLINIC | Age: 85
End: 2025-08-26

## 2025-09-02 ENCOUNTER — APPOINTMENT (OUTPATIENT)
Dept: GERIATRICS | Facility: CLINIC | Age: 85
End: 2025-09-02

## 2025-09-04 ENCOUNTER — APPOINTMENT (OUTPATIENT)
Dept: GERIATRICS | Facility: CLINIC | Age: 85
End: 2025-09-04
Payer: MEDICARE

## 2025-09-04 VITALS
WEIGHT: 216 LBS | SYSTOLIC BLOOD PRESSURE: 138 MMHG | HEART RATE: 83 BPM | OXYGEN SATURATION: 95 % | BODY MASS INDEX: 30.13 KG/M2 | TEMPERATURE: 97.7 F | DIASTOLIC BLOOD PRESSURE: 82 MMHG

## 2025-09-04 DIAGNOSIS — F02.B18 ALZHEIMER'S DISEASE WITH LATE ONSET: ICD-10-CM

## 2025-09-04 DIAGNOSIS — N40.1 BENIGN PROSTATIC HYPERPLASIA WITH LOWER URINARY TRACT SYMPMS: ICD-10-CM

## 2025-09-04 DIAGNOSIS — G30.1 ALZHEIMER'S DISEASE WITH LATE ONSET: ICD-10-CM

## 2025-09-04 DIAGNOSIS — E03.9 HYPOTHYROIDISM, UNSPECIFIED: ICD-10-CM

## 2025-09-04 DIAGNOSIS — E11.59 TYPE 2 DIABETES MELLITUS WITH OTHER CIRCULATORY COMPLICATIONS: ICD-10-CM

## 2025-09-04 DIAGNOSIS — F32.4 MAJOR DEPRESSIVE DISORDER, SINGLE EPISODE, IN PARTIAL REMISSION: ICD-10-CM

## 2025-09-04 DIAGNOSIS — I50.30 UNSPECIFIED DIASTOLIC (CONGESTIVE) HEART FAILURE: ICD-10-CM

## 2025-09-04 DIAGNOSIS — I10 ESSENTIAL (PRIMARY) HYPERTENSION: ICD-10-CM

## 2025-09-04 DIAGNOSIS — N13.8 BENIGN PROSTATIC HYPERPLASIA WITH LOWER URINARY TRACT SYMPMS: ICD-10-CM

## 2025-09-04 PROCEDURE — 99349 HOME/RES VST EST MOD MDM 40: CPT
